# Patient Record
Sex: FEMALE | Race: WHITE
[De-identification: names, ages, dates, MRNs, and addresses within clinical notes are randomized per-mention and may not be internally consistent; named-entity substitution may affect disease eponyms.]

---

## 2020-08-01 ENCOUNTER — HOSPITAL ENCOUNTER (EMERGENCY)
Dept: HOSPITAL 62 - ER | Age: 30
Discharge: HOME | End: 2020-08-01
Payer: COMMERCIAL

## 2020-08-01 VITALS — SYSTOLIC BLOOD PRESSURE: 118 MMHG | DIASTOLIC BLOOD PRESSURE: 80 MMHG

## 2020-08-01 DIAGNOSIS — F41.9: ICD-10-CM

## 2020-08-01 DIAGNOSIS — R51: ICD-10-CM

## 2020-08-01 DIAGNOSIS — Z20.828: ICD-10-CM

## 2020-08-01 DIAGNOSIS — M79.10: Primary | ICD-10-CM

## 2020-08-01 DIAGNOSIS — F17.200: ICD-10-CM

## 2020-08-01 DIAGNOSIS — J34.89: ICD-10-CM

## 2020-08-01 PROCEDURE — 87070 CULTURE OTHR SPECIMN AEROBIC: CPT

## 2020-08-01 PROCEDURE — 87635 SARS-COV-2 COVID-19 AMP PRB: CPT

## 2020-08-01 PROCEDURE — 87880 STREP A ASSAY W/OPTIC: CPT

## 2020-08-01 PROCEDURE — 99283 EMERGENCY DEPT VISIT LOW MDM: CPT

## 2020-08-01 PROCEDURE — C9803 HOPD COVID-19 SPEC COLLECT: HCPCS

## 2020-08-01 NOTE — ER DOCUMENT REPORT
HPI





- HPI


Time Seen by Provider: 08/01/20 14:33


Pain Level: 5


Context: 





Patient is a 30-year-old female who presents to the emergency department with a 

chief complaint of body aches and generally not feeling well.  Patient states 

that her symptoms started a couple days ago.She also states that she has a 

headache and a runny nose.  States that she is feeling tired.  Patient does have

a history of anxiety.  She does not know if she has had any contact with anybody

who has tested positive for COVID-19.  Takes Vistaril as needed for anxiety.





- ROS


Systems Reviewed and Negative: Yes All other systems reviewed and negative





- CONSTITUTIONAL


Constitutional: DENIES: Fever, Chills


Notes: 





Body aches





- EENT


EENT: DENIES: Sore Throat, Ear Pain





- NEURO


Neurology: REPORTS: Headache.  DENIES: Weakness





- RESPIRATORY


Respiratory: DENIES: Trouble Breathing, Coughing





- GASTROINTESTINAL


Gastrointestinal: DENIES: Abdominal Pain, Nausea, Patient vomiting





- REPRODUCTIVE


Reproductive: DENIES: Pregnant:





- MUSCULOSKELETAL


Musculoskeletal: DENIES: Extremity pain





- DERM


Skin Color: Normal


Skin Problems: None





Past Medical History





- General


Information source: Patient





- Social History


Smoking Status: Current Every Day Smoker


Chew tobacco use (# tins/day): No


Frequency of alcohol use: None


Drug Abuse: None


Family History: Reviewed & Not Pertinent


Patient has homicidal ideation: No





Vertical Provider Document





- CONSTITUTIONAL


Agree With Documented VS: Yes


Exam Limitations: No Limitations


General Appearance: No Apparent Distress





- HEENT


HEENT: Atraumatic, Normocephalic, PERRLA


Notes: 





Edema and erythema noted to nasal mucosa





- NECK


Neck: Normal Inspection





- RESPIRATORY


Respiratory: Breath Sounds Normal, No Respiratory Distress





- CARDIOVASCULAR


Cardiovascular: Regular Rate, Regular Rhythm


Pulses: Normal: Radial





- GI/ABDOMEN


Gastrointestinal: Abdomen Soft, Abdomen Non-Tender





- MUSCULOSKELETAL/EXTREMETIES


Musculoskeletal/Extremeties: FROM





- NEURO


Level of Consciousness: Awake, Alert, Appropriate


Motor/Sensory: No Motor Deficit, No Sensory Deficit





- DERM


Integumentary: Warm, Dry, No Rash





Course





- Re-evaluation


Re-evalutation: 





08/02/20 01:25


Rapid strep test is negative.  Patient states that she feels better after 

receiving Tylenol.  Based off the patient's symptoms and the current pandemic 

going on with COVID-19, the patient will be tested for COVID-19.  Advised the 

patient to take Tylenol as prescribed.  Patient agrees to self isolate at home. 

Lung sounds are clear.  I will low suspicion for pneumonia, urinary tract 

infection, or any life-threatening etiology at this time.  Follow-up precautions

were given.  Verbal discharge instructions were given to the patient.  They 

verbalized understanding.  They are stable for discharge.








- Vital Signs


Vital signs: 


                                        











Temp Pulse Resp BP Pulse Ox


 


 99.4 F   74   16   135/78 H  100 


 


 08/01/20 14:31  08/01/20 14:31  08/01/20 14:31  08/01/20 14:31  08/01/20 14:31














Discharge





- Discharge


Clinical Impression: 


 Rhinorrhea, Suspected COVID-19 virus infection





Condition: Stable


Disposition: HOME, SELF-CARE


Instructions:  COVID-19 Guidance for Persons Under Investigation


Additional Instructions: 


You were seen today in the emergency department for a runny nose.  Here a rapid 

strep test was negative, but a throat culture is being sent to check if there is

any other bacteria that needs to be treated.  Start cetirizine and Flonase.  You

can take these medications over-the-counter or use the prescription provided.  

You are also being tested for COVID-19.  Please self isolate in your home until 

your results are back.  If your results are positive, you must stay in your home

for at least 2 weeks.  Follow-up with 1 of the clinics below.


Prescriptions: 


Cetirizine HCl [All Day Allergy] 10 mg PO DAILY #30 tablet


Fluticasone Propionate [Flonase Nasal Spray 50 Mcg/Spray 16 gm] 2 sprays NASL 

DAILY #1 inhaler


Referrals: 


[facility] [Provider Group] - Follow up as needed


[facility] [Provider Group] - Follow up as needed